# Patient Record
Sex: MALE | Race: OTHER | HISPANIC OR LATINO
[De-identification: names, ages, dates, MRNs, and addresses within clinical notes are randomized per-mention and may not be internally consistent; named-entity substitution may affect disease eponyms.]

---

## 2020-01-21 ENCOUNTER — NON-APPOINTMENT (OUTPATIENT)
Age: 45
End: 2020-01-21

## 2020-01-21 ENCOUNTER — APPOINTMENT (OUTPATIENT)
Dept: CARDIOLOGY | Facility: CLINIC | Age: 45
End: 2020-01-21
Payer: COMMERCIAL

## 2020-01-21 VITALS
SYSTOLIC BLOOD PRESSURE: 116 MMHG | BODY MASS INDEX: 27.32 KG/M2 | RESPIRATION RATE: 16 BRPM | DIASTOLIC BLOOD PRESSURE: 68 MMHG | WEIGHT: 164 LBS | HEIGHT: 65 IN | HEART RATE: 80 BPM | OXYGEN SATURATION: 98 %

## 2020-01-21 DIAGNOSIS — K43.9 VENTRAL HERNIA W/OUT OBSTRUCTION OR GANGRENE: ICD-10-CM

## 2020-01-21 DIAGNOSIS — Z78.9 OTHER SPECIFIED HEALTH STATUS: ICD-10-CM

## 2020-01-21 DIAGNOSIS — Z82.3 FAMILY HISTORY OF STROKE: ICD-10-CM

## 2020-01-21 DIAGNOSIS — Z00.00 ENCOUNTER FOR GENERAL ADULT MEDICAL EXAMINATION W/OUT ABNORMAL FINDINGS: ICD-10-CM

## 2020-01-21 PROCEDURE — 99203 OFFICE O/P NEW LOW 30 MIN: CPT

## 2020-01-21 PROCEDURE — 93000 ELECTROCARDIOGRAM COMPLETE: CPT

## 2020-01-21 NOTE — PHYSICAL EXAM
[General Appearance - In No Acute Distress] : no acute distress [Normal Oral Mucosa] : normal oral mucosa [Normal Conjunctiva] : the conjunctiva exhibited no abnormalities [Auscultation Breath Sounds / Voice Sounds] : lungs were clear to auscultation bilaterally [Abdomen Soft] : soft [Abnormal Walk] : normal gait [Abdomen Tenderness] : non-tender [Nail Clubbing] : no clubbing of the fingernails [Skin Color & Pigmentation] : normal skin color and pigmentation [Cyanosis, Localized] : no localized cyanosis [Oriented To Time, Place, And Person] : oriented to person, place, and time [Affect] : the affect was normal [Rhythm Regular] : regular [Normal Rate] : normal [Normal S1] : normal S1 [Normal S2] : normal S2 [II] : a grade 2 [FreeTextEntry1] : No JVD, no carotid bruits. [S3] : no S3 [S4] : no S4

## 2020-01-21 NOTE — DISCUSSION/SUMMARY
[FreeTextEntry1] : 1. Proceed with endoscopy as planned.\par 2. Monitor through the procedure until stable post procedurally.\par 3. Patient is not in any additional testing at this time but I will plan echocardiogram to evaluate his murmur.\par 4. He will have baseline blood work including lipids, hemoglobin A1c and CRP.\par 5. I have given a referral for a primary doctor.\par 6. Follow up here in two months.

## 2020-01-21 NOTE — HISTORY OF PRESENT ILLNESS
[FreeTextEntry1] : Patient comes to the office today in anticipation of having an endoscopy for recent epigastric discomfort. Patient states for the last 2 months or so he has been having a lot of epigastric discomfort associated with belching. He feels as if this is secondary to gas and never had issues with belching in the past. The discomfort tends to go on throughout the day.  He does note the pain gets a little worse when he presses on his epigastrium with no other significant aggravating or relieving factors. He works in a physical job and is able to do all of his exertion without any worsening discomfort in his epigastrium and without any other chest pain. He reports no shortness of breath at any time.  Patient denies palpitations, orthopnea, presyncope, syncope.

## 2020-01-21 NOTE — ASSESSMENT
[FreeTextEntry1] : EKG: Sinus rhythm with no significant ST or T wave changes.\par \par 44-year-old man with no significant past medical history presents with her evaluation prior to undergoing endoscopy. Patient's epigastric pain appears to definitely be gastrointestinal in nature. Patient is very low risk for cardiovascular disease at this time and I do not believe there is any additional workup needed prior to undergoing endoscopy. He does have a heart murmur which is likely a flow murmur and I will evaluate that by echocardiogram. Additionally he should have baseline blood work as he does not have a primary doctor but I've encouraged him to get a primary doctor as well. There is no cardiac contraindication to proceeding with endoscopy at this time.

## 2020-03-19 ENCOUNTER — APPOINTMENT (OUTPATIENT)
Dept: CARDIOLOGY | Facility: CLINIC | Age: 45
End: 2020-03-19

## 2020-07-08 ENCOUNTER — APPOINTMENT (OUTPATIENT)
Dept: CARDIOLOGY | Facility: CLINIC | Age: 45
End: 2020-07-08

## 2020-08-20 ENCOUNTER — APPOINTMENT (OUTPATIENT)
Dept: CARDIOLOGY | Facility: CLINIC | Age: 45
End: 2020-08-20

## 2020-09-12 ENCOUNTER — APPOINTMENT (OUTPATIENT)
Dept: CARDIOLOGY | Facility: CLINIC | Age: 45
End: 2020-09-12
Payer: COMMERCIAL

## 2020-09-12 PROCEDURE — 93306 TTE W/DOPPLER COMPLETE: CPT

## 2020-09-29 ENCOUNTER — APPOINTMENT (OUTPATIENT)
Dept: CARDIOLOGY | Facility: CLINIC | Age: 45
End: 2020-09-29
Payer: COMMERCIAL

## 2020-09-29 ENCOUNTER — NON-APPOINTMENT (OUTPATIENT)
Age: 45
End: 2020-09-29

## 2020-09-29 VITALS
OXYGEN SATURATION: 98 % | BODY MASS INDEX: 26.66 KG/M2 | SYSTOLIC BLOOD PRESSURE: 119 MMHG | HEIGHT: 65 IN | HEART RATE: 83 BPM | TEMPERATURE: 97.8 F | WEIGHT: 160 LBS | DIASTOLIC BLOOD PRESSURE: 68 MMHG | RESPIRATION RATE: 16 BRPM

## 2020-09-29 DIAGNOSIS — R01.1 CARDIAC MURMUR, UNSPECIFIED: ICD-10-CM

## 2020-09-29 DIAGNOSIS — R10.13 EPIGASTRIC PAIN: ICD-10-CM

## 2020-09-29 PROCEDURE — 99213 OFFICE O/P EST LOW 20 MIN: CPT

## 2020-09-29 PROCEDURE — 93000 ELECTROCARDIOGRAM COMPLETE: CPT

## 2020-09-29 RX ORDER — PANTOPRAZOLE 20 MG/1
20 TABLET, DELAYED RELEASE ORAL
Refills: 0 | Status: DISCONTINUED | COMMUNITY
End: 2020-09-29

## 2020-09-29 NOTE — HISTORY OF PRESENT ILLNESS
[FreeTextEntry1] : Patient returns to me today having never had an endoscopy because of COVID.  He still has epigastric pain at times but overall this seems to be better and less frequent. He does also get some pain in his chest and left arm when he moves his head in certain positions or lays in certain positions. He reports no chest pain at all. He otherwise feels physically well. Patient denies shortness of breath, palpitations, orthopnea, presyncope, syncope.

## 2020-09-29 NOTE — DISCUSSION/SUMMARY
[FreeTextEntry1] : 1. Proceed with endoscopy if planned.\par 2. Monitor through the procedure until stable post procedurally.\par 3. No additional cardiac testing at this time.\par 4. He will have baseline blood work including lipids, hemoglobin A1c and CRP.\par 5. I have given a referral for a primary doctor.\par 6. He can follow up with me as needed and if he has any new symptoms or concerns he should certainly let me know. No need for routine cardiac appointment at this time. I will discuss the results of his blood work over the phone.

## 2020-09-29 NOTE — ASSESSMENT
[FreeTextEntry1] : Echocardiogram September 12, 2020 demonstrated left ventricle normal size and function with ejection fraction 60-65%. Trace mitral and pulmonic regurgitation noted. No other significant structural abnormalities.\par \par EKG: Sinus rhythm with no significant ST or T wave changes.\par \par 45-year-old man with no significant past medical history presents for f/u.  Patient is still having some occasional epigastric discomfort but overall this is improved. He reports no new symptoms at this time. He has no chest pain at all. Blood pressure remains well-controlled. EKG remains normal. Echocardiogram showed no significant abnormalities with no valvulopathy and a normal EF. At this time he appears very well and stable from a cardiac standpoint. I have given him a prescription for some baseline blood work and recommend he followup with a primary doctor and his GI but there is no need for further cardiac workup or treatment at this time.

## 2021-11-11 ENCOUNTER — OUTPATIENT (OUTPATIENT)
Dept: OUTPATIENT SERVICES | Facility: HOSPITAL | Age: 46
LOS: 1 days | End: 2021-11-11

## 2021-11-11 DIAGNOSIS — Z20.822 CONTACT WITH AND (SUSPECTED) EXPOSURE TO COVID-19: ICD-10-CM

## 2021-11-11 LAB — SARS-COV-2 RNA SPEC QL NAA+PROBE: SIGNIFICANT CHANGE UP
